# Patient Record
Sex: FEMALE | Race: OTHER | HISPANIC OR LATINO | URBAN - METROPOLITAN AREA
[De-identification: names, ages, dates, MRNs, and addresses within clinical notes are randomized per-mention and may not be internally consistent; named-entity substitution may affect disease eponyms.]

---

## 2022-10-01 ENCOUNTER — OUTPATIENT (OUTPATIENT)
Dept: OUTPATIENT SERVICES | Facility: HOSPITAL | Age: 37
LOS: 1 days | End: 2022-10-01
Payer: COMMERCIAL

## 2022-10-01 DIAGNOSIS — O26.899 OTHER SPECIFIED PREGNANCY RELATED CONDITIONS, UNSPECIFIED TRIMESTER: ICD-10-CM

## 2022-10-01 DIAGNOSIS — Z3A.00 WEEKS OF GESTATION OF PREGNANCY NOT SPECIFIED: ICD-10-CM

## 2022-10-01 PROCEDURE — 99214 OFFICE O/P EST MOD 30 MIN: CPT

## 2022-10-01 PROCEDURE — 90715 TDAP VACCINE 7 YRS/> IM: CPT

## 2022-10-01 RX ORDER — TETANUS TOXOID, REDUCED DIPHTHERIA TOXOID AND ACELLULAR PERTUSSIS VACCINE, ADSORBED 5; 2.5; 8; 8; 2.5 [IU]/.5ML; [IU]/.5ML; UG/.5ML; UG/.5ML; UG/.5ML
0.5 SUSPENSION INTRAMUSCULAR ONCE
Refills: 0 | Status: DISCONTINUED | OUTPATIENT
Start: 2022-10-01 | End: 2022-10-16

## 2022-10-19 ENCOUNTER — APPOINTMENT (OUTPATIENT)
Dept: ANTEPARTUM | Facility: CLINIC | Age: 37
End: 2022-10-19

## 2022-10-19 ENCOUNTER — ASOB RESULT (OUTPATIENT)
Age: 37
End: 2022-10-19

## 2022-10-19 PROCEDURE — 76819 FETAL BIOPHYS PROFIL W/O NST: CPT | Mod: 59

## 2022-10-19 PROCEDURE — 99072 ADDL SUPL MATRL&STAF TM PHE: CPT

## 2022-10-19 PROCEDURE — 76816 OB US FOLLOW-UP PER FETUS: CPT

## 2022-10-25 ENCOUNTER — OUTPATIENT (OUTPATIENT)
Dept: INPATIENT UNIT | Facility: HOSPITAL | Age: 37
LOS: 1 days | End: 2022-10-25

## 2022-10-25 VITALS
TEMPERATURE: 98 F | RESPIRATION RATE: 16 BRPM | HEART RATE: 84 BPM | OXYGEN SATURATION: 98 % | DIASTOLIC BLOOD PRESSURE: 67 MMHG | HEIGHT: 64.17 IN | SYSTOLIC BLOOD PRESSURE: 107 MMHG

## 2022-10-25 VITALS
TEMPERATURE: 98 F | SYSTOLIC BLOOD PRESSURE: 111 MMHG | HEART RATE: 83 BPM | RESPIRATION RATE: 16 BRPM | OXYGEN SATURATION: 97 % | DIASTOLIC BLOOD PRESSURE: 69 MMHG

## 2022-10-25 PROCEDURE — 76815 OB US LIMITED FETUS(S): CPT | Mod: 26

## 2022-10-25 PROCEDURE — 99285 EMERGENCY DEPT VISIT HI MDM: CPT

## 2022-10-25 PROCEDURE — 59025 FETAL NON-STRESS TEST: CPT | Mod: 26

## 2022-10-25 NOTE — ED ADULT TRIAGE NOTE - CHIEF COMPLAINT QUOTE
Patient [place] to the train and felt like she couldn't walk anymore.  C/o low back pain radiating down left leg.  ~ 36 weeks pregnant, due 11/20/22.  Good fetal movement, denies any vaginal bleeding.  Already had ob evaluation today.

## 2022-10-25 NOTE — ED ADULT NURSE NOTE - OBJECTIVE STATEMENT
Pt sent by her OB for leg weakness. States she was walking and felt like her leg was too weak. OB did blood work in office. Pt denies incontinence. VSS, resting in stretcher in NAD. Denies headache/vision changes.

## 2022-10-25 NOTE — ED ADULT TRIAGE NOTE - NS ED TRIAGE CLINICAL UPGRADE
Deteriorating patient status - Patient was clinically upgraded due to deteriorating patient status. The patient is a 8y11m Female complaining of abdominal pain.

## 2022-10-25 NOTE — ED PROVIDER NOTE - NSFOLLOWUPINSTRUCTIONS_ED_ALL_ED_FT
[facility]                                                                                                       Sciatica       Sciatica is pain, numbness, weakness, or tingling along the path of the sciatic nerve. The sciatic nerve starts in the lower back and runs down the back of each leg. The nerve controls the muscles in the lower leg and in the back of the knee. It also provides feeling (sensation) to the back of the thigh, the lower leg, and the sole of the foot. Sciatica is a symptom of another medical condition that pinches or puts pressure on the sciatic nerve.    Sciatica most often only affects one side of the body. Sciatica usually goes away on its own or with treatment. In some cases, sciatica may come back (recur).      What are the causes?    This condition is caused by pressure on the sciatic nerve or pinching of the nerve. This may be the result of:  •A disk in between the bones of the spine bulging out too far (herniated disk).      •Age-related changes in the spinal disks.      •A pain disorder that affects a muscle in the buttock.      •Extra bone growth near the sciatic nerve.      •A break (fracture) of the pelvis.      •Pregnancy.      •Tumor. This is rare.        What increases the risk?    The following factors may make you more likely to develop this condition:  •Playing sports that place pressure or stress on the spine.      •Having poor strength and flexibility.      •A history of back injury or surgery.      •Sitting for long periods of time.      •Doing activities that involve repetitive bending or lifting.      •Obesity.        What are the signs or symptoms?    Symptoms can vary from mild to very severe, and they may include:•Any of these problems in the lower back, leg, hip, or buttock:  •Mild tingling, numbness, or dull aches.      •Burning sensations.      •Sharp pains.        •Numbness in the back of the calf or the sole of the foot.      •Leg weakness.      •Severe back pain that makes movement difficult.      Symptoms may get worse when you cough, sneeze, or laugh, or when you sit or stand for long periods of time.      How is this diagnosed?    This condition may be diagnosed based on:  •Your symptoms and medical history.      •A physical exam.      •Blood tests.    •Imaging tests, such as:  •X-rays.      •MRI.      •CT scan.          How is this treated?    In many cases, this condition improves on its own without treatment. However, treatment may include:  •Reducing or modifying physical activity.      •Exercising and stretching.      •Icing and applying heat to the affected area.    •Medicines that help to:  •Relieve pain and swelling.      •Relax your muscles.        •Injections of medicines that help to relieve pain, irritation, and inflammation around the sciatic nerve (steroids).      •Surgery.        Follow these instructions at home:    Medicines     •Take over-the-counter and prescription medicines only as told by your health care provider.    •Ask your health care provider if the medicine prescribed to you:  •Requires you to avoid driving or using heavy machinery.    •Can cause constipation. You may need to take these actions to prevent or treat constipation:  •Drink enough fluid to keep your urine pale yellow.      •Take over-the-counter or prescription medicines.      •Eat foods that are high in fiber, such as beans, whole grains, and fresh fruits and vegetables.      •Limit foods that are high in fat and processed sugars, such as fried or sweet foods.            Managing pain                 •If directed, put ice on the affected area.  •Put ice in a plastic bag.      •Place a towel between your skin and the bag.      •Leave the ice on for 20 minutes, 2–3 times a day.      •If directed, apply heat to the affected area. Use the heat source that your health care provider recommends, such as a moist heat pack or a heating pad.  •Place a towel between your skin and the heat source.      •Leave the heat on for 20–30 minutes.      •Remove the heat if your skin turns bright red. This is especially important if you are unable to feel pain, heat, or cold. You may have a greater risk of getting burned.          Activity      •Return to your normal activities as told by your health care provider. Ask your health care provider what activities are safe for you.      •Avoid activities that make your symptoms worse.    •Take brief periods of rest throughout the day.  •When you rest for longer periods, mix in some mild activity or stretching between periods of rest. This will help to prevent stiffness and pain.      •Avoid sitting for long periods of time without moving. Get up and move around at least one time each hour.        •Exercise and stretch regularly, as told by your health care provider.      • Do not lift anything that is heavier than 10 lb (4.5 kg) while you have symptoms of sciatica. When you do not have symptoms, you should still avoid heavy lifting, especially repetitive heavy lifting.    •When you lift objects, always use proper lifting technique, which includes:  •Bending your knees.      •Keeping the load close to your body.      •Avoiding twisting.        General instructions     •Maintain a healthy weight. Excess weight puts extra stress on your back.      •Wear supportive, comfortable shoes. Avoid wearing high heels.      •Avoid sleeping on a mattress that is too soft or too hard. A mattress that is firm enough to support your back when you sleep may help to reduce your pain.      •Keep all follow-up visits as told by your health care provider. This is important.        Contact a health care provider if:  •You have pain that:  •Wakes you up when you are sleeping.      •Gets worse when you lie down.      •Is worse than you have experienced in the past.      •Lasts longer than 4 weeks.        •You have an unexplained weight loss.        Get help right away if:    •You are not able to control when you urinate or have bowel movements (incontinence).    •You have:  •Weakness in your lower back, pelvis, buttocks, or legs that gets worse.      •Redness or swelling of your back.      •A burning sensation when you urinate.          Summary    •Sciatica is pain, numbness, weakness, or tingling along the path of the sciatic nerve.      •This condition is caused by pressure on the sciatic nerve or pinching of the nerve.      •Sciatica can cause pain, numbness, or tingling in the lower back, legs, hips, and buttocks.      •Treatment often includes rest, exercise, medicines, and applying ice or heat.      This information is not intended to replace advice given to you by your health care provider. Make sure you discuss any questions you have with your health care provider.      Document Revised: 01/06/2020 Document Reviewed: 01/06/2020    Elsevier Patient Education © 2022 [employer]

## 2022-10-25 NOTE — ED PROVIDER NOTE - CLINICAL SUMMARY MEDICAL DECISION MAKING FREE TEXT BOX
38 yo female  36 weeks pregnant female sent to the ED by her OBGYN for LLE weakness and pain. Exam unremarkable. No focal neuro deficits on exam- no concern for CVA. No s/s spinal cord compression. No traumatic injuries. Clinical picture consistent with sciatica. Conservative management. Currently asymptomatic.

## 2022-10-25 NOTE — ED PROVIDER NOTE - PHYSICAL EXAMINATION
VITAL SIGNS: I have reviewed nursing notes and confirm.  CONSTITUTIONAL: Well appearing, in no acute distress.   SKIN:  warm and dry, no acute rash.   HEAD:  normocephalic, atraumatic.  EYES: EOM intact; conjunctiva and sclera clear.  ENT: No nasal discharge; airway clear.   NECK: Supple; non tender.  BACK: no midline or paraspinal ttp  CARD: S1, S2 normal; no murmurs, gallops, or rubs. Regular rate and rhythm.   RESP:  Clear to auscultation b/l, no wheezes, rales or rhonchi.  ABD: Gravid abdomen, no tenderness   EXT: Normal ROM. No clubbing, cyanosis or edema. 2+ pulses to b/l ue/le.  NEURO: Alert, oriented, CNs intact, no sensory deficit, normal gait and coordination, strength 5/5 in all extremities  PSYCH: Cooperative, mood and affect appropriate.

## 2022-11-02 ENCOUNTER — APPOINTMENT (OUTPATIENT)
Dept: ANTEPARTUM | Facility: CLINIC | Age: 37
End: 2022-11-02

## 2022-11-02 ENCOUNTER — ASOB RESULT (OUTPATIENT)
Age: 37
End: 2022-11-02

## 2022-11-02 PROBLEM — Z00.00 ENCOUNTER FOR PREVENTIVE HEALTH EXAMINATION: Status: ACTIVE | Noted: 2022-11-02

## 2022-11-02 PROCEDURE — 76816 OB US FOLLOW-UP PER FETUS: CPT

## 2022-11-02 PROCEDURE — 99072 ADDL SUPL MATRL&STAF TM PHE: CPT

## 2022-11-02 PROCEDURE — 76819 FETAL BIOPHYS PROFIL W/O NST: CPT | Mod: 59

## 2022-11-04 ENCOUNTER — APPOINTMENT (OUTPATIENT)
Dept: PEDIATRIC CARDIOLOGY | Facility: CLINIC | Age: 37
End: 2022-11-04

## 2022-11-04 PROCEDURE — 99203 OFFICE O/P NEW LOW 30 MIN: CPT | Mod: 25

## 2022-11-04 PROCEDURE — 76821 MIDDLE CEREBRAL ARTERY ECHO: CPT

## 2022-11-04 PROCEDURE — 76825 ECHO EXAM OF FETAL HEART: CPT

## 2022-11-04 PROCEDURE — 76827 ECHO EXAM OF FETAL HEART: CPT

## 2022-11-04 PROCEDURE — 76820 UMBILICAL ARTERY ECHO: CPT

## 2022-11-04 PROCEDURE — 99072 ADDL SUPL MATRL&STAF TM PHE: CPT

## 2022-11-04 PROCEDURE — 93325 DOPPLER ECHO COLOR FLOW MAPG: CPT | Mod: 59

## 2022-11-09 ENCOUNTER — ASOB RESULT (OUTPATIENT)
Age: 37
End: 2022-11-09

## 2022-11-09 ENCOUNTER — APPOINTMENT (OUTPATIENT)
Dept: ANTEPARTUM | Facility: CLINIC | Age: 37
End: 2022-11-09

## 2022-11-09 PROCEDURE — 76820 UMBILICAL ARTERY ECHO: CPT | Mod: 59

## 2022-11-09 PROCEDURE — 76819 FETAL BIOPHYS PROFIL W/O NST: CPT

## 2022-11-09 PROCEDURE — 99072 ADDL SUPL MATRL&STAF TM PHE: CPT

## 2022-11-15 ENCOUNTER — OUTPATIENT (OUTPATIENT)
Dept: OUTPATIENT SERVICES | Facility: HOSPITAL | Age: 37
LOS: 1 days | End: 2022-11-15
Payer: COMMERCIAL

## 2022-11-15 DIAGNOSIS — Z01.818 ENCOUNTER FOR OTHER PREPROCEDURAL EXAMINATION: ICD-10-CM

## 2022-11-15 LAB
ALBUMIN SERPL ELPH-MCNC: 3.9 G/DL — SIGNIFICANT CHANGE UP (ref 3.3–5)
ALP SERPL-CCNC: 167 U/L — HIGH (ref 40–120)
ALT FLD-CCNC: 25 U/L — SIGNIFICANT CHANGE UP (ref 10–45)
ANION GAP SERPL CALC-SCNC: 11 MMOL/L — SIGNIFICANT CHANGE UP (ref 5–17)
AST SERPL-CCNC: 52 U/L — HIGH (ref 10–40)
BILIRUB SERPL-MCNC: 0.4 MG/DL — SIGNIFICANT CHANGE UP (ref 0.2–1.2)
BLD GP AB SCN SERPL QL: NEGATIVE — SIGNIFICANT CHANGE UP
BUN SERPL-MCNC: 10 MG/DL — SIGNIFICANT CHANGE UP (ref 7–23)
CALCIUM SERPL-MCNC: 8.5 MG/DL — SIGNIFICANT CHANGE UP (ref 8.4–10.5)
CHLORIDE SERPL-SCNC: 106 MMOL/L — SIGNIFICANT CHANGE UP (ref 96–108)
CO2 SERPL-SCNC: 21 MMOL/L — LOW (ref 22–31)
CREAT SERPL-MCNC: 0.51 MG/DL — SIGNIFICANT CHANGE UP (ref 0.5–1.3)
EGFR: 122 ML/MIN/1.73M2 — SIGNIFICANT CHANGE UP
GLUCOSE SERPL-MCNC: 99 MG/DL — SIGNIFICANT CHANGE UP (ref 70–99)
HCT VFR BLD CALC: 34 % — LOW (ref 34.5–45)
HGB BLD-MCNC: 11.4 G/DL — LOW (ref 11.5–15.5)
MCHC RBC-ENTMCNC: 31.6 PG — SIGNIFICANT CHANGE UP (ref 27–34)
MCHC RBC-ENTMCNC: 33.5 GM/DL — SIGNIFICANT CHANGE UP (ref 32–36)
MCV RBC AUTO: 94.2 FL — SIGNIFICANT CHANGE UP (ref 80–100)
NRBC # BLD: 0 /100 WBCS — SIGNIFICANT CHANGE UP (ref 0–0)
PLATELET # BLD AUTO: 284 K/UL — SIGNIFICANT CHANGE UP (ref 150–400)
POTASSIUM SERPL-MCNC: 3.7 MMOL/L — SIGNIFICANT CHANGE UP (ref 3.5–5.3)
POTASSIUM SERPL-SCNC: 3.7 MMOL/L — SIGNIFICANT CHANGE UP (ref 3.5–5.3)
PROT SERPL-MCNC: 7.1 G/DL — SIGNIFICANT CHANGE UP (ref 6–8.3)
RBC # BLD: 3.61 M/UL — LOW (ref 3.8–5.2)
RBC # FLD: 13.2 % — SIGNIFICANT CHANGE UP (ref 10.3–14.5)
RH IG SCN BLD-IMP: POSITIVE — SIGNIFICANT CHANGE UP
SODIUM SERPL-SCNC: 138 MMOL/L — SIGNIFICANT CHANGE UP (ref 135–145)
T PALLIDUM AB TITR SER: NEGATIVE — SIGNIFICANT CHANGE UP
WBC # BLD: 5.61 K/UL — SIGNIFICANT CHANGE UP (ref 3.8–10.5)
WBC # FLD AUTO: 5.61 K/UL — SIGNIFICANT CHANGE UP (ref 3.8–10.5)

## 2022-11-15 PROCEDURE — 86780 TREPONEMA PALLIDUM: CPT

## 2022-11-15 PROCEDURE — 86901 BLOOD TYPING SEROLOGIC RH(D): CPT

## 2022-11-15 PROCEDURE — 86900 BLOOD TYPING SEROLOGIC ABO: CPT

## 2022-11-15 PROCEDURE — 85027 COMPLETE CBC AUTOMATED: CPT

## 2022-11-15 PROCEDURE — 86850 RBC ANTIBODY SCREEN: CPT

## 2022-11-15 PROCEDURE — 80053 COMPREHEN METABOLIC PANEL: CPT

## 2022-11-16 ENCOUNTER — TRANSCRIPTION ENCOUNTER (OUTPATIENT)
Age: 37
End: 2022-11-16

## 2022-11-17 ENCOUNTER — INPATIENT (INPATIENT)
Facility: HOSPITAL | Age: 37
LOS: 1 days | Discharge: HOME | End: 2022-11-19
Attending: OBSTETRICS & GYNECOLOGY | Admitting: OBSTETRICS & GYNECOLOGY
Payer: COMMERCIAL

## 2022-11-17 ENCOUNTER — RESULT REVIEW (OUTPATIENT)
Age: 37
End: 2022-11-17

## 2022-11-17 VITALS — WEIGHT: 177.91 LBS | HEIGHT: 62 IN

## 2022-11-17 LAB
BLD GP AB SCN SERPL QL: NEGATIVE — SIGNIFICANT CHANGE UP
COVID-19 SPIKE DOMAIN AB INTERP: POSITIVE
COVID-19 SPIKE DOMAIN ANTIBODY RESULT: >250 U/ML — HIGH
RH IG SCN BLD-IMP: POSITIVE — SIGNIFICANT CHANGE UP
SARS-COV-2 IGG+IGM SERPL QL IA: >250 U/ML — HIGH
SARS-COV-2 IGG+IGM SERPL QL IA: POSITIVE

## 2022-11-17 PROCEDURE — 88304 TISSUE EXAM BY PATHOLOGIST: CPT | Mod: 26

## 2022-11-17 DEVICE — SEPRAFILM 5 X 6": Type: IMPLANTABLE DEVICE | Status: FUNCTIONAL

## 2022-11-17 RX ORDER — ENOXAPARIN SODIUM 100 MG/ML
40 INJECTION SUBCUTANEOUS EVERY 24 HOURS
Refills: 0 | Status: DISCONTINUED | OUTPATIENT
Start: 2022-11-17 | End: 2022-11-19

## 2022-11-17 RX ORDER — ACETAMINOPHEN 500 MG
975 TABLET ORAL
Refills: 0 | Status: DISCONTINUED | OUTPATIENT
Start: 2022-11-17 | End: 2022-11-19

## 2022-11-17 RX ORDER — KETOROLAC TROMETHAMINE 30 MG/ML
30 SYRINGE (ML) INJECTION EVERY 6 HOURS
Refills: 0 | Status: DISCONTINUED | OUTPATIENT
Start: 2022-11-17 | End: 2022-11-18

## 2022-11-17 RX ORDER — OXYCODONE HYDROCHLORIDE 5 MG/1
5 TABLET ORAL ONCE
Refills: 0 | Status: DISCONTINUED | OUTPATIENT
Start: 2022-11-17 | End: 2022-11-19

## 2022-11-17 RX ORDER — FAMOTIDINE 10 MG/ML
20 INJECTION INTRAVENOUS ONCE
Refills: 0 | Status: COMPLETED | OUTPATIENT
Start: 2022-11-17 | End: 2022-11-17

## 2022-11-17 RX ORDER — IBUPROFEN 200 MG
600 TABLET ORAL EVERY 6 HOURS
Refills: 0 | Status: COMPLETED | OUTPATIENT
Start: 2022-11-17 | End: 2023-10-16

## 2022-11-17 RX ORDER — CEFAZOLIN SODIUM 1 G
2000 VIAL (EA) INJECTION ONCE
Refills: 0 | Status: COMPLETED | OUTPATIENT
Start: 2022-11-17 | End: 2022-11-17

## 2022-11-17 RX ORDER — SODIUM CHLORIDE 9 MG/ML
1000 INJECTION, SOLUTION INTRAVENOUS
Refills: 0 | Status: DISCONTINUED | OUTPATIENT
Start: 2022-11-17 | End: 2022-11-17

## 2022-11-17 RX ORDER — OXYTOCIN 10 UNIT/ML
333.33 VIAL (ML) INJECTION
Qty: 20 | Refills: 0 | Status: DISCONTINUED | OUTPATIENT
Start: 2022-11-17 | End: 2022-11-17

## 2022-11-17 RX ORDER — OXYCODONE HYDROCHLORIDE 5 MG/1
5 TABLET ORAL
Refills: 0 | Status: COMPLETED | OUTPATIENT
Start: 2022-11-17 | End: 2022-11-24

## 2022-11-17 RX ORDER — OXYTOCIN 10 UNIT/ML
333.33 VIAL (ML) INJECTION
Qty: 20 | Refills: 0 | Status: DISCONTINUED | OUTPATIENT
Start: 2022-11-17 | End: 2022-11-19

## 2022-11-17 RX ORDER — LANOLIN
1 OINTMENT (GRAM) TOPICAL EVERY 6 HOURS
Refills: 0 | Status: DISCONTINUED | OUTPATIENT
Start: 2022-11-17 | End: 2022-11-19

## 2022-11-17 RX ORDER — SIMETHICONE 80 MG/1
80 TABLET, CHEWABLE ORAL EVERY 4 HOURS
Refills: 0 | Status: DISCONTINUED | OUTPATIENT
Start: 2022-11-17 | End: 2022-11-19

## 2022-11-17 RX ORDER — SODIUM CHLORIDE 9 MG/ML
1000 INJECTION, SOLUTION INTRAVENOUS ONCE
Refills: 0 | Status: COMPLETED | OUTPATIENT
Start: 2022-11-17 | End: 2022-11-17

## 2022-11-17 RX ORDER — SODIUM CHLORIDE 9 MG/ML
1000 INJECTION, SOLUTION INTRAVENOUS
Refills: 0 | Status: DISCONTINUED | OUTPATIENT
Start: 2022-11-17 | End: 2022-11-19

## 2022-11-17 RX ORDER — DIPHENHYDRAMINE HCL 50 MG
25 CAPSULE ORAL EVERY 6 HOURS
Refills: 0 | Status: DISCONTINUED | OUTPATIENT
Start: 2022-11-17 | End: 2022-11-19

## 2022-11-17 RX ORDER — TETANUS TOXOID, REDUCED DIPHTHERIA TOXOID AND ACELLULAR PERTUSSIS VACCINE, ADSORBED 5; 2.5; 8; 8; 2.5 [IU]/.5ML; [IU]/.5ML; UG/.5ML; UG/.5ML; UG/.5ML
0.5 SUSPENSION INTRAMUSCULAR ONCE
Refills: 0 | Status: DISCONTINUED | OUTPATIENT
Start: 2022-11-17 | End: 2022-11-19

## 2022-11-17 RX ORDER — MAGNESIUM HYDROXIDE 400 MG/1
30 TABLET, CHEWABLE ORAL
Refills: 0 | Status: DISCONTINUED | OUTPATIENT
Start: 2022-11-17 | End: 2022-11-19

## 2022-11-17 RX ORDER — CITRIC ACID/SODIUM CITRATE 300-500 MG
30 SOLUTION, ORAL ORAL ONCE
Refills: 0 | Status: COMPLETED | OUTPATIENT
Start: 2022-11-17 | End: 2022-11-17

## 2022-11-17 RX ADMIN — Medication 30 MILLIGRAM(S): at 15:40

## 2022-11-17 RX ADMIN — Medication 30 MILLIGRAM(S): at 12:40

## 2022-11-17 RX ADMIN — FAMOTIDINE 20 MILLIGRAM(S): 10 INJECTION INTRAVENOUS at 07:31

## 2022-11-17 RX ADMIN — Medication 30 MILLILITER(S): at 07:30

## 2022-11-17 RX ADMIN — Medication 975 MILLIGRAM(S): at 20:49

## 2022-11-17 RX ADMIN — Medication 975 MILLIGRAM(S): at 21:19

## 2022-11-17 RX ADMIN — Medication 100 MILLIGRAM(S): at 07:46

## 2022-11-17 RX ADMIN — Medication 30 MILLIGRAM(S): at 23:55

## 2022-11-17 RX ADMIN — Medication 1000 MILLIUNIT(S)/MIN: at 10:41

## 2022-11-17 RX ADMIN — SODIUM CHLORIDE 2000 MILLILITER(S): 9 INJECTION, SOLUTION INTRAVENOUS at 06:40

## 2022-11-17 RX ADMIN — Medication 1000 MILLIUNIT(S)/MIN: at 09:20

## 2022-11-17 RX ADMIN — Medication 30 MILLIGRAM(S): at 18:38

## 2022-11-17 RX ADMIN — ENOXAPARIN SODIUM 40 MILLIGRAM(S): 100 INJECTION SUBCUTANEOUS at 22:55

## 2022-11-17 NOTE — PRE-ANESTHESIA EVALUATION ADULT - NSANTHPMHFT_GEN_ALL_CORE
36 y/o  @ 39w presents for a repeat  with bilateral salpingectomy. Denies VB/LOF/ctx.   FM    Ante: prenatal care in [place]. Passed GT. Denies NIPT done in [facility]. Reports sonograms WNL. Pt established care a month ago.   Fetal Echo done to R/O CHD (large ductal aneurysm, otherwise structually normal heart), fetus recommended to obtain fetal echo postpartum  EFW 3000g    OBhx:  G1: c/s for cephalopelvic disproportion , arrest of dilation  GYN: Denies hx fibroids, cysts, STIs, abnormal pap smears.   MedHx: Denies  SurgHx: C section   Meds: PNV  Allergies: NKDA

## 2022-11-17 NOTE — PATIENT PROFILE OB - FALL HARM RISK - UNIVERSAL INTERVENTIONS
Bed in lowest position, wheels locked, appropriate side rails in place/Call bell, personal items and telephone in reach/Instruct patient to call for assistance before getting out of bed or chair/Non-slip footwear when patient is out of bed/[place] to call system/Physically safe environment - no spills, clutter or unnecessary equipment/Purposeful Proactive Rounding/Room/bathroom lighting operational, light cord in reach

## 2022-11-18 ENCOUNTER — TRANSCRIPTION ENCOUNTER (OUTPATIENT)
Age: 37
End: 2022-11-18

## 2022-11-18 ENCOUNTER — APPOINTMENT (OUTPATIENT)
Dept: ANTEPARTUM | Facility: CLINIC | Age: 37
End: 2022-11-18

## 2022-11-18 RX ORDER — IBUPROFEN 200 MG
1 TABLET ORAL
Qty: 0 | Refills: 0 | DISCHARGE
Start: 2022-11-18

## 2022-11-18 RX ORDER — IBUPROFEN 200 MG
600 TABLET ORAL EVERY 6 HOURS
Refills: 0 | Status: DISCONTINUED | OUTPATIENT
Start: 2022-11-18 | End: 2022-11-19

## 2022-11-18 RX ORDER — ACETAMINOPHEN 500 MG
3 TABLET ORAL
Qty: 0 | Refills: 0 | DISCHARGE
Start: 2022-11-18

## 2022-11-18 RX ORDER — OXYCODONE HYDROCHLORIDE 5 MG/1
5 TABLET ORAL
Refills: 0 | Status: DISCONTINUED | OUTPATIENT
Start: 2022-11-18 | End: 2022-11-19

## 2022-11-18 RX ADMIN — OXYCODONE HYDROCHLORIDE 5 MILLIGRAM(S): 5 TABLET ORAL at 22:00

## 2022-11-18 RX ADMIN — Medication 30 MILLIGRAM(S): at 00:25

## 2022-11-18 RX ADMIN — Medication 600 MILLIGRAM(S): at 19:23

## 2022-11-18 RX ADMIN — Medication 30 MILLIGRAM(S): at 06:49

## 2022-11-18 RX ADMIN — OXYCODONE HYDROCHLORIDE 5 MILLIGRAM(S): 5 TABLET ORAL at 10:30

## 2022-11-18 RX ADMIN — SIMETHICONE 80 MILLIGRAM(S): 80 TABLET, CHEWABLE ORAL at 14:27

## 2022-11-18 RX ADMIN — Medication 975 MILLIGRAM(S): at 08:57

## 2022-11-18 RX ADMIN — Medication 975 MILLIGRAM(S): at 14:25

## 2022-11-18 RX ADMIN — Medication 600 MILLIGRAM(S): at 14:00

## 2022-11-18 RX ADMIN — Medication 600 MILLIGRAM(S): at 18:49

## 2022-11-18 RX ADMIN — OXYCODONE HYDROCHLORIDE 5 MILLIGRAM(S): 5 TABLET ORAL at 22:30

## 2022-11-18 RX ADMIN — Medication 975 MILLIGRAM(S): at 22:24

## 2022-11-18 RX ADMIN — Medication 600 MILLIGRAM(S): at 11:33

## 2022-11-18 RX ADMIN — MAGNESIUM HYDROXIDE 30 MILLILITER(S): 400 TABLET, CHEWABLE ORAL at 14:26

## 2022-11-18 RX ADMIN — MAGNESIUM HYDROXIDE 30 MILLILITER(S): 400 TABLET, CHEWABLE ORAL at 10:59

## 2022-11-18 RX ADMIN — Medication 975 MILLIGRAM(S): at 08:22

## 2022-11-18 RX ADMIN — OXYCODONE HYDROCHLORIDE 5 MILLIGRAM(S): 5 TABLET ORAL at 10:10

## 2022-11-18 RX ADMIN — Medication 975 MILLIGRAM(S): at 21:54

## 2022-11-18 RX ADMIN — Medication 975 MILLIGRAM(S): at 15:00

## 2022-11-18 RX ADMIN — Medication 30 MILLIGRAM(S): at 06:19

## 2022-11-18 RX ADMIN — ENOXAPARIN SODIUM 40 MILLIGRAM(S): 100 INJECTION SUBCUTANEOUS at 21:55

## 2022-11-18 NOTE — LACTATION INITIAL EVALUATION - NS LACT CON REASON FOR REQ
Ind[place]ian language line  [phone]. Pt denies any complaints when asked. She has expressible colostrum and reports infantis nursing well. Mother plans to offer breast and formula feedings by choice. Pt made aware of LC availability as needed./multiparous mom/staff request

## 2022-11-18 NOTE — DISCHARGE NOTE OB - MEDICATION SUMMARY - MEDICATIONS TO TAKE
I will START or STAY ON the medications listed below when I get home from the hospital:    acetaminophen 325 mg oral tablet  -- 3 tab(s) by mouth every 6 hours  -- Indication: For Pain    ibuprofen 600 mg oral tablet  -- 1 tab(s) by mouth every 6 hours  -- Indication: For Pain    Prenatal 1 oral capsule  -- Indication: For PREGNANCY

## 2022-11-18 NOTE — DISCHARGE NOTE OB - PATIENT PORTAL LINK FT
You can access the FollowMyHealth Patient Portal offered by [facility] by registering at the following website: [email or web address]. By joining [employer]’s FollowMyHealth portal, you will also be able to view your health information using other applications (apps) compatible with our system.

## 2022-11-18 NOTE — DISCHARGE NOTE OB - CARE PROVIDER_API CALL
June Velazquez)  Obstetrics and Gynecology  [address] [place] [id number]  Phone: [phone]  Fax: [phone]  Follow Up Time: 2 weeks

## 2022-11-18 NOTE — LACTATION INITIAL EVALUATION - LACTATION INTERVENTIONS
initiate/review hand expression/initiate/review techniques for position and latch/reviewed components of an effective feeding and at least 8 effective feedings per day required/reviewed importance of monitoring infant diapers, the breastfeeding log, and minimum output each day/reviewed benefits and recommendations for rooming in/reviewed feeding on demand/by cue at least 8 times a day/reviewed indications of inadequate milk transfer that would require supplementation

## 2022-11-18 NOTE — DISCHARGE NOTE OB - CARE PLAN
1 Principal Discharge DX:	Pregnancy  Assessment and plan of treatment:	 delivery, meeting all postoperative milestones.  Follow up in 1-2 weeks.

## 2022-11-19 VITALS
OXYGEN SATURATION: 96 % | SYSTOLIC BLOOD PRESSURE: 104 MMHG | HEART RATE: 81 BPM | DIASTOLIC BLOOD PRESSURE: 66 MMHG | RESPIRATION RATE: 17 BRPM | TEMPERATURE: 98 F

## 2022-11-19 LAB
HCT VFR BLD CALC: 28.3 % — LOW (ref 34.5–45)
HGB BLD-MCNC: 9.5 G/DL — LOW (ref 11.5–15.5)
MCHC RBC-ENTMCNC: 32.4 PG — SIGNIFICANT CHANGE UP (ref 27–34)
MCHC RBC-ENTMCNC: 33.6 GM/DL — SIGNIFICANT CHANGE UP (ref 32–36)
MCV RBC AUTO: 96.6 FL — SIGNIFICANT CHANGE UP (ref 80–100)
NRBC # BLD: 0 /100 WBCS — SIGNIFICANT CHANGE UP (ref 0–0)
PLATELET # BLD AUTO: 250 K/UL — SIGNIFICANT CHANGE UP (ref 150–400)
RBC # BLD: 2.93 M/UL — LOW (ref 3.8–5.2)
RBC # FLD: 13.5 % — SIGNIFICANT CHANGE UP (ref 10.3–14.5)
WBC # BLD: 9.01 K/UL — SIGNIFICANT CHANGE UP (ref 3.8–10.5)
WBC # FLD AUTO: 9.01 K/UL — SIGNIFICANT CHANGE UP (ref 3.8–10.5)

## 2022-11-19 PROCEDURE — 88304 TISSUE EXAM BY PATHOLOGIST: CPT

## 2022-11-19 PROCEDURE — 86769 SARS-COV-2 COVID-19 ANTIBODY: CPT

## 2022-11-19 PROCEDURE — 86850 RBC ANTIBODY SCREEN: CPT

## 2022-11-19 PROCEDURE — 86901 BLOOD TYPING SEROLOGIC RH(D): CPT

## 2022-11-19 PROCEDURE — C1765: CPT

## 2022-11-19 PROCEDURE — 86900 BLOOD TYPING SEROLOGIC ABO: CPT

## 2022-11-19 PROCEDURE — 59050 FETAL MONITOR W/REPORT: CPT

## 2022-11-19 PROCEDURE — 36415 COLL VENOUS BLD VENIPUNCTURE: CPT

## 2022-11-19 PROCEDURE — 85027 COMPLETE CBC AUTOMATED: CPT

## 2022-11-19 RX ADMIN — Medication 600 MILLIGRAM(S): at 12:23

## 2022-11-19 RX ADMIN — Medication 975 MILLIGRAM(S): at 02:49

## 2022-11-19 RX ADMIN — Medication 600 MILLIGRAM(S): at 06:55

## 2022-11-19 RX ADMIN — Medication 600 MILLIGRAM(S): at 07:25

## 2022-11-19 RX ADMIN — Medication 600 MILLIGRAM(S): at 00:03

## 2022-11-19 RX ADMIN — Medication 600 MILLIGRAM(S): at 00:33

## 2022-11-19 RX ADMIN — Medication 975 MILLIGRAM(S): at 02:19

## 2022-11-19 RX ADMIN — Medication 975 MILLIGRAM(S): at 09:49

## 2022-11-19 RX ADMIN — Medication 600 MILLIGRAM(S): at 13:23

## 2022-11-19 RX ADMIN — Medication 975 MILLIGRAM(S): at 10:49

## 2022-11-19 NOTE — PROGRESS NOTE ADULT - SUBJECTIVE AND OBJECTIVE BOX
Patient evaluated at bedside.   She reports pain is well controlled with OPM.  She has been ambulating without assistance, voiding spontaneously, passing gas, tolerating regular diet and is breastfeeding.  She denies HA, dizziness, CP, palpitations, SOB, n/v, or heavy vaginal bleeding.    Physical Exam:  Vital Signs Last 24 Hrs  T(C): 37 (19 Nov 2022 05:26), Max: 37 (18 Nov 2022 10:19)  T(F): 98.6 (19 Nov 2022 05:26), Max: 98.6 (18 Nov 2022 10:19)  HR: 82 (19 Nov 2022 05:26) (76 - 88)  BP: 107/68 (19 Nov 2022 05:26) (102/65 - 111/62)  BP(mean): --  RR: 18 (19 Nov 2022 05:26) (17 - 18)  SpO2: 98% (19 Nov 2022 05:26) (97% - 98%)    Parameters below as of 18 Nov 2022 14:00  Patient On (Oxygen Delivery Method): room air        Gen: NAD  Abd: + BS, soft, nontender, nondistended, no rebound or guarding  Incision clean, dry and intact  uterus firm at midline below the umbilicus  : lochia WNL  Extremities: no                         9.5    9.01  )-----------( 250      ( 19 Nov 2022 05:30 )             28.3               
Patient evaluated at bedside this morning, resting comfortable in bed, with no acute events overnight.  She reports pain is well controlled with toradol and tylenol.  She denies headache, dizziness, chest pain, palpitations, shortness of breath, nausea, vomiting or heavy vaginal bleeding.  She has not tried ambulating since procedure, carmona remains in place at this time. Tolerating clear liquids.     Physical Exam:  Vital Signs Last 24 Hrs  T(C): 37.2 (18 Nov 2022 05:41), Max: 37.2 (18 Nov 2022 05:41)  T(F): 99 (18 Nov 2022 05:41), Max: 99 (18 Nov 2022 05:41)  HR: 84 (18 Nov 2022 05:41) (56 - 106)  BP: 105/63 (18 Nov 2022 05:41) (100/60 - 133/63)  BP(mean): 82 (17 Nov 2022 12:15) (78 - 91)  RR: 16 (18 Nov 2022 05:41) (14 - 17)  SpO2: 97% (18 Nov 2022 05:41) (91% - 97%)    Parameters below as of 17 Nov 2022 12:30  Patient On (Oxygen Delivery Method): room air        GA: NAD, A+0 x 3  CV: RRR, no murmurs/rubs  Pulm: CTAB, no wheezes/rhonchi  Breasts: soft, nontender, no palpable masses  Abd: + BS, soft, nontender, nondistended, no rebound or guarding, incision clean, dry and intact, uterus firm at midline and below umbilicus  : carmona in situ, lochia WNL  Extremities: no swelling or calf tenderness, reflexes +2 bilaterally, SCD in place                  acetaminophen     Tablet .. 975 milliGRAM(s) Oral <User Schedule>  diphenhydrAMINE 25 milliGRAM(s) Oral every 6 hours PRN  diphtheria/tetanus/pertussis (acellular) Vaccine (Adacel) 0.5 milliLiter(s) IntraMuscular once  enoxaparin Injectable 40 milliGRAM(s) SubCutaneous every 24 hours  ibuprofen  Tablet. 600 milliGRAM(s) Oral every 6 hours  lactated ringers. 1000 milliLiter(s) IV Continuous <Continuous>  lanolin Ointment 1 Application(s) Topical every 6 hours PRN  magnesium hydroxide Suspension 30 milliLiter(s) Oral two times a day PRN  oxyCODONE    IR 5 milliGRAM(s) Oral every 3 hours PRN  oxyCODONE    IR 5 milliGRAM(s) Oral once PRN  oxytocin Infusion 333.333 milliUNIT(s)/Min IV Continuous <Continuous>  simethicone 80 milliGRAM(s) Chew every 4 hours PRN

## 2022-11-19 NOTE — PROGRESS NOTE ADULT - ASSESSMENT
37y Female POD#2  s/p C/S, Uncomplicated                                       1. Neuro/Pain:  OPM  2  CV:  VS per routine  3. Pulm: Encourage ISS & Ambulation  4. GI:  Reg  5. : Voiding  6. DVT ppx: SCDs, lovenox 40mg qd  7. Dispo: POD #2 or #3
A/P   37y  s/p  section, POD #1, stable  -  Pain: PO motrin and tylenol, oxycodone PRN  -  Post-operatively labs: post-op Hgb , hemodynamically stable, no symptoms of anemia   -  GI: tolerating clears, passing gas, ADAT  -  : carmona in situ; DC this AM, f/u TOV  -  DVT prophylaxis: ambulation, SCDs, lovenox  -  Dispo: POD 3 or 4

## 2022-11-19 NOTE — PROGRESS NOTE ADULT - ATTENDING COMMENTS
Pt seen and examined at bedside, pt h as no current complaints. Agree with above evaluation and assessment. Plan for discharge today if patient remains hemodynamically stable
Patient seen and examined at bedside. Agree with above evaluation and assessment. Since note patient has passed her TOV and has eaten and passed gas. Pain well controlled. Plan for discharge POD 2 or 3 if pt remains hemodynamically stable

## 2022-11-26 ENCOUNTER — EMERGENCY (EMERGENCY)
Facility: HOSPITAL | Age: 37
LOS: 1 days | Discharge: HOME | End: 2022-11-26
Attending: EMERGENCY MEDICINE | Admitting: EMERGENCY MEDICINE
Payer: COMMERCIAL

## 2022-11-26 VITALS
HEART RATE: 70 BPM | SYSTOLIC BLOOD PRESSURE: 116 MMHG | RESPIRATION RATE: 18 BRPM | OXYGEN SATURATION: 95 % | DIASTOLIC BLOOD PRESSURE: 66 MMHG

## 2022-11-26 VITALS
RESPIRATION RATE: 18 BRPM | WEIGHT: 160.06 LBS | OXYGEN SATURATION: 98 % | DIASTOLIC BLOOD PRESSURE: 71 MMHG | TEMPERATURE: 98 F | SYSTOLIC BLOOD PRESSURE: 122 MMHG | HEIGHT: 62 IN | HEART RATE: 76 BPM

## 2022-11-26 DIAGNOSIS — R79.89 OTHER SPECIFIED ABNORMAL FINDINGS OF BLOOD CHEMISTRY: ICD-10-CM

## 2022-11-26 DIAGNOSIS — R51.9 HEADACHE, UNSPECIFIED: ICD-10-CM

## 2022-11-26 LAB
ALBUMIN SERPL ELPH-MCNC: 3.6 G/DL — SIGNIFICANT CHANGE UP (ref 3.3–5)
ALP SERPL-CCNC: 154 U/L — HIGH (ref 40–120)
ALT FLD-CCNC: 129 U/L — HIGH (ref 10–45)
ANION GAP SERPL CALC-SCNC: 11 MMOL/L — SIGNIFICANT CHANGE UP (ref 5–17)
AST SERPL-CCNC: 273 U/L — HIGH (ref 10–40)
BASOPHILS # BLD AUTO: 0.01 K/UL — SIGNIFICANT CHANGE UP (ref 0–0.2)
BASOPHILS NFR BLD AUTO: 0.2 % — SIGNIFICANT CHANGE UP (ref 0–2)
BILIRUB SERPL-MCNC: 0.2 MG/DL — SIGNIFICANT CHANGE UP (ref 0.2–1.2)
BUN SERPL-MCNC: 14 MG/DL — SIGNIFICANT CHANGE UP (ref 7–23)
CALCIUM SERPL-MCNC: 8.8 MG/DL — SIGNIFICANT CHANGE UP (ref 8.4–10.5)
CHLORIDE SERPL-SCNC: 110 MMOL/L — HIGH (ref 96–108)
CO2 SERPL-SCNC: 21 MMOL/L — LOW (ref 22–31)
CREAT SERPL-MCNC: 0.41 MG/DL — LOW (ref 0.5–1.3)
EGFR: 129 ML/MIN/1.73M2 — SIGNIFICANT CHANGE UP
EOSINOPHIL # BLD AUTO: 0.13 K/UL — SIGNIFICANT CHANGE UP (ref 0–0.5)
EOSINOPHIL NFR BLD AUTO: 2.3 % — SIGNIFICANT CHANGE UP (ref 0–6)
GLUCOSE SERPL-MCNC: 92 MG/DL — SIGNIFICANT CHANGE UP (ref 70–99)
HCT VFR BLD CALC: 29.5 % — LOW (ref 34.5–45)
HGB BLD-MCNC: 9.8 G/DL — LOW (ref 11.5–15.5)
IMM GRANULOCYTES NFR BLD AUTO: 0.5 % — SIGNIFICANT CHANGE UP (ref 0–0.9)
LYMPHOCYTES # BLD AUTO: 1.26 K/UL — SIGNIFICANT CHANGE UP (ref 1–3.3)
LYMPHOCYTES # BLD AUTO: 22.2 % — SIGNIFICANT CHANGE UP (ref 13–44)
MCHC RBC-ENTMCNC: 32 PG — SIGNIFICANT CHANGE UP (ref 27–34)
MCHC RBC-ENTMCNC: 33.2 GM/DL — SIGNIFICANT CHANGE UP (ref 32–36)
MCV RBC AUTO: 96.4 FL — SIGNIFICANT CHANGE UP (ref 80–100)
MONOCYTES # BLD AUTO: 0.4 K/UL — SIGNIFICANT CHANGE UP (ref 0–0.9)
MONOCYTES NFR BLD AUTO: 7 % — SIGNIFICANT CHANGE UP (ref 2–14)
NEUTROPHILS # BLD AUTO: 3.85 K/UL — SIGNIFICANT CHANGE UP (ref 1.8–7.4)
NEUTROPHILS NFR BLD AUTO: 67.8 % — SIGNIFICANT CHANGE UP (ref 43–77)
NRBC # BLD: 0 /100 WBCS — SIGNIFICANT CHANGE UP (ref 0–0)
PLATELET # BLD AUTO: 439 K/UL — HIGH (ref 150–400)
POTASSIUM SERPL-MCNC: 3.5 MMOL/L — SIGNIFICANT CHANGE UP (ref 3.5–5.3)
POTASSIUM SERPL-SCNC: 3.5 MMOL/L — SIGNIFICANT CHANGE UP (ref 3.5–5.3)
PROT SERPL-MCNC: 6.8 G/DL — SIGNIFICANT CHANGE UP (ref 6–8.3)
RBC # BLD: 3.06 M/UL — LOW (ref 3.8–5.2)
RBC # FLD: 13.3 % — SIGNIFICANT CHANGE UP (ref 10.3–14.5)
SARS-COV-2 RNA SPEC QL NAA+PROBE: NEGATIVE — SIGNIFICANT CHANGE UP
SODIUM SERPL-SCNC: 142 MMOL/L — SIGNIFICANT CHANGE UP (ref 135–145)
WBC # BLD: 5.68 K/UL — SIGNIFICANT CHANGE UP (ref 3.8–10.5)
WBC # FLD AUTO: 5.68 K/UL — SIGNIFICANT CHANGE UP (ref 3.8–10.5)

## 2022-11-26 PROCEDURE — 85025 COMPLETE CBC W/AUTO DIFF WBC: CPT

## 2022-11-26 PROCEDURE — 83615 LACTATE (LD) (LDH) ENZYME: CPT

## 2022-11-26 PROCEDURE — 99284 EMERGENCY DEPT VISIT MOD MDM: CPT

## 2022-11-26 PROCEDURE — 96375 TX/PRO/DX INJ NEW DRUG ADDON: CPT

## 2022-11-26 PROCEDURE — 87635 SARS-COV-2 COVID-19 AMP PRB: CPT

## 2022-11-26 PROCEDURE — 96374 THER/PROPH/DIAG INJ IV PUSH: CPT

## 2022-11-26 PROCEDURE — 99284 EMERGENCY DEPT VISIT MOD MDM: CPT | Mod: 25

## 2022-11-26 PROCEDURE — 80053 COMPREHEN METABOLIC PANEL: CPT

## 2022-11-26 PROCEDURE — 36415 COLL VENOUS BLD VENIPUNCTURE: CPT

## 2022-11-26 PROCEDURE — 84550 ASSAY OF BLOOD/URIC ACID: CPT

## 2022-11-26 RX ORDER — METOCLOPRAMIDE HCL 10 MG
10 TABLET ORAL ONCE
Refills: 0 | Status: COMPLETED | OUTPATIENT
Start: 2022-11-26 | End: 2022-11-26

## 2022-11-26 RX ORDER — SODIUM CHLORIDE 9 MG/ML
1000 INJECTION INTRAMUSCULAR; INTRAVENOUS; SUBCUTANEOUS ONCE
Refills: 0 | Status: COMPLETED | OUTPATIENT
Start: 2022-11-26 | End: 2022-11-26

## 2022-11-26 RX ORDER — KETOROLAC TROMETHAMINE 30 MG/ML
15 SYRINGE (ML) INJECTION ONCE
Refills: 0 | Status: DISCONTINUED | OUTPATIENT
Start: 2022-11-26 | End: 2022-11-26

## 2022-11-26 RX ADMIN — Medication 15 MILLIGRAM(S): at 15:15

## 2022-11-26 RX ADMIN — Medication 104 MILLIGRAM(S): at 15:15

## 2022-11-26 RX ADMIN — SODIUM CHLORIDE 1000 MILLILITER(S): 9 INJECTION INTRAMUSCULAR; INTRAVENOUS; SUBCUTANEOUS at 15:15

## 2022-11-26 NOTE — ED ADULT NURSE NOTE - OBJECTIVE STATEMENT
patient presents to ED with Lt. side headache for a week, patient had epistaxis and vomiting yesterday, denies weakness, drooling, other neuro deficits, fever, cough, chest pain, sob, dizziness. stable at bed A&Ox4.

## 2022-11-26 NOTE — ED PROVIDER NOTE - NS ED ATTENDING STATEMENT MOD
This was a shared visit with the NIHARIKA. I reviewed and verified the documentation and independently performed the documented:

## 2022-11-26 NOTE — ED PROVIDER NOTE - CLINICAL SUMMARY MEDICAL DECISION MAKING FREE TEXT BOX
pt is 10d post partum c/o 3d of h/a, no visual changes/n/v/fevers/dizziness, not hypertensive in ed, has not taken any tyl or motrin for the h/a, not thunder clap, not worst h/a of life, non focal neuro exam, well appearing, given toradol/reglan in ed w/good relief, however found to have elevated LFTs - gyn consulted for preeclampsia -- pt seen and cleared for dc w/strict return precautions, pt understands and agrees w/plan, strict return precautions given

## 2022-11-26 NOTE — ED PROVIDER NOTE - OBJECTIVE STATEMENT
The pt is a 38 y/o F, who presents to ED c/o h/a x few d. Pt is 10d post partum, states that h/a is constant and throbbing, R sided, 7/10, has not taken any tyl or motrin for it. Had a nosebleed few d ago and concerned that it's related. Denies visual changes, n/v/d, neck pain, dizziness, syncope, fevers, chills, gait disturbances, vag bleeding.

## 2022-11-26 NOTE — ED ADULT TRIAGE NOTE - CHIEF COMPLAINT QUOTE
Pt 10 days postpartum-  at 39wks. C/o R sided HA x3days. Also c/o epistaxis 1 episode daily x3days. Reports blurred vision yesterday morning. Denies blurred vision in triage. Denies fevers, chills, n/v, chest pain, SOB, leg swelling, dizziness

## 2022-11-26 NOTE — ED PROVIDER NOTE - CARE PROVIDER_API CALL
June Velazquez)  Obstetrics and Gynecology  [address] [place] [address]  Phone: [phone]  Fax: [phone]  Follow Up Time:

## 2022-11-26 NOTE — CONSULT NOTE ADULT - SUBJECTIVE AND OBJECTIVE BOX
38 y/o  POD#9 s/p scheduled rCS + BS presenting to ED for evaluation of HA since Thursday. Pt states that she has been experiencing HA on and off, as well as blurry vision yesterday which spontaneously resolved. She has been taking PO pain medications at home but has not noticed if it helps the HA. She denies any elevated BP in pregnancy or postpartum. Pt also concerned that she has not been having vaginal bleeding for the past two days. Upon presentation to the ED pt received reglan and toradol and HA resolved however she was found to have elevated LFTs 273/129. She currently denies HA, vision changes/scotoma, chest pain, palpitations, SOB, abdominal pain, RUQ/epigastric pain, nausea/vomiting, diarrhea, or dysuria.    ObHx: G1 -  - pCS for arrest of dilation, uncomplicated  G2 - 22 - rCS + BS, uncomplicated  GynHx: denies  PMH: denies  PSH: CSx2  Meds: denies  Allergies: NKDA        PHYSICAL EXAM:   Vital Signs Last 24 Hrs  T(C): 36.8 (2022 15:19), Max: 36.9 (2022 14:00)  T(F): 98.3 (2022 15:19), Max: 98.5 (2022 14:00)  HR: 70 (2022 16:04) (60 - 76)  BP: 116/66 (2022 16:04) (112/67 - 122/71)  RR: 18 (2022 16:04) (18 - 18)  SpO2: 95% (2022 16:04) (95% - 98%)    Parameters below as of 2022 16:04  Patient On (Oxygen Delivery Method): room air        **************************  Constitutional: Alert & Oriented x3, No acute distress  Respiratory: Clear to ausculation bilaterally; no wheezing, rhonchi, or crackles no inc WOB  Cardiovascular: regular rate and rhythm  Gastrointestinal: soft, non tender, positive bowel sounds, no rebound or guarding, no RUQ/epigastric tenderness, neg Miller sign, incision clean/dry/intact w/ steri strips  Extremities: no calf tenderness, mild LE edema, 1+ upper extremity reflexes b/l    LABS:                        9.8    5.68  )-----------( 439      ( 2022 15:00 )             29.5         142  |  110<H>  |  14  ----------------------------<  92  3.5   |  21<L>  |  0.41<L>    Ca    8.8      2022 15:00    TPro  6.8  /  Alb  3.6  /  TBili  0.2  /  DBili  x   /  AST  273<H>  /  ALT  129<H>  /  AlkPhos  154<H>

## 2022-11-26 NOTE — ED PROVIDER NOTE - NSFOLLOWUPINSTRUCTIONS_ED_ALL_ED_FT
CONTROLE RUIZ PRESIÓN ARTERIAL DOS VECES AL DÍA. TOME TYLENOL O MOTRIN SEGÚN SE NECESITE PARA EL DOLOR DE PRASHANT. SEGUIMIENTO CON DR ISAAK MORAN. REGRESE INMEDIATAMENTE POR PRESIÓN ARTERIAL >160, NÁUSEAS, VÓMITOS, ALTERACIONES VISUALES.    Preeclampsia and Eclampsia  Preeclampsia is a serious condition that may develop during pregnancy. This condition involves high blood pressure during pregnancy and causes symptoms such as headaches, vision changes, and increased swelling in the legs, hands, and face. Preeclampsia occurs after 20 weeks of pregnancy. Eclampsia is a seizure that happens from worsening preeclampsia. Diagnosing and managing preeclampsia early is important. If not treated early, it can cause serious problems for mother and baby  There is no cure for this condition. However, during pregnancy, delivering the baby may be the best treatment for preeclampsia or eclampsia. For most women, symptoms of preeclampsia and eclampsia go away after giving birth. In rare cases, a woman may develop preeclampsia or eclampsia after giving birth. This usually occurs within 48 hours after childbirth but may occur up to 6 weeks after giving birth.  What are the causes?  The cause of this condition is not known.  What increases the risk?  The following factors make you more likely to develop preeclampsia:  •Being pregnant for the first time or being pregnant with multiples.  •Having had preeclampsia or a condition called hemolysis, elevated liver enzymes, and low platelet count (HELLP)syndrome during a past pregnancy.  •Having a family history of preeclampsia.  •Being older than age 35.  •Being obese.  •Becoming pregnant through fertility treatments.  Conditions that reduce blood flow or oxygen to your placenta and baby may also increase your risk. These include:  •High blood pressure before, during, or immediately following pregnancy.  •Kidney disease.  •Diabetes  •Blood clotting disorders.  •Autoimmune diseases, such as lupus.  •Sleep apnea.  What are the signs or symptoms?  Common symptoms of this condition include:  •A severe, throbbing headache that does not go away.  •Vision problems, such as blurred or double vision and light sensitivity.  •Pain in the stomach, especially the right upper region.  •Pain in the shoulder.  Other symptoms that may develop as the condition gets worse include:  •Sudden weight gain because of fluid buildup in the body. This causes swelling of the face, hands, legs, and feet.  •Severe nausea and vomiting.  •Urinating less than usual.  •Shortness of breath.  •Seizures.  How is this diagnosed?  Your health care provider will ask you about symptoms and check for signs of preeclampsia during your prenatal visits. You will also have routine tests, including:  •Checking your blood pressure.  •Urine tests to check for protein.  •Blood tests to assess your organ function.  •Monitoring your baby's heart rate.  •Ultrasounds to check fetal growth.  How is this treated?  You and your health care provider will determine the treatment that is best for you. Treatment may include:  •Frequent prenatal visits to check for preeclampsia.  •Medicine to lower your blood pressure.  •Medicine to prevent seizures.  •Low-dose aspirin during your pregnancy.  •Staying in the hospital, in severe cases. You will be given medicines to control your blood pressure and the amount of fluids in your body.  •Delivering your baby.  Work with your health care provider to manage any chronic health conditions, such as diabetes or kidney problems. Also, work with your health care provider to manage weight gain during pregnancy.  Follow these instructions at home:  Eating and drinking   •Drink enough fluid to keep your urine pale yellow.  •Avoid caffeine. Caffeine may increase blood pressure and heart rate and lead to dehydration.  •Reduce the amount of salt that you eat.  Lifestyle   • Do not use any products that contain nicotine or tobacco. These products include cigarettes, chewing tobacco, and vaping devices, such as e-cigarettes. If you need help quitting, ask your health care provider.  • Do not use alcohol or drugs.  •Avoid stress as much as possible.  •Rest and get plenty of sleep.  General instructions   •Take over-the-counter and prescription medicines only as told by your health care provider.  •When lying down, lie on your left side. This keeps pressure off your major blood vessels.  •When sitting or lying down, raise (elevate) your feet. Try putting pillows underneath your lower legs  •Exercise regularly. Ask your health care provider what kinds of exercise are best for you.  •Check your blood pressure as often as recommended by your health care provider.  •Keep all prenatal and follow-up visits. This is important.  Contact a health care provider if:  •You have symptoms that may need treatment or closer monitoring. These include:  •Headaches.  •Stomach pain or nausea and vomiting.  •Shoulder pain.  •Vision problems, such as spots in front of your eyes or blurry vision.  •Sudden weight gain or increased swelling in your face, hands, legs, and feet.  •Increased anxiety or feeling of impending doom.  •Signs or symptoms of labor.  Get help right away if:  •You have any of the following symptoms:  •A seizure  •Shortness of breath or trouble breathing.  •Trouble speaking or slurred speech.  •Fainting.  •Chest pain.  These symptoms may represent a serious problem that is an emergency. Do not wait to see if the symptoms will go away. Get medical help right away. Call your local emergency services (911 in the [place] Do not drive yourself to the hospital.   Summary  •Preeclampsia is a serious condition that may develop during pregnancy.  •Diagnosing and treating preeclampsia early is very important.  •Keep all prenatal and follow-up visits. This is important.  •Get help right away if you have a seizure, shortness of breath or trouble breathing, trouble speaking or slurred speech, chest pain, or fainting.

## 2022-11-26 NOTE — ED PROVIDER NOTE - PATIENT PORTAL LINK FT
You can access the FollowMyHealth Patient Portal offered by [facility] by registering at the following website: [email or web address]. By joining [facility]’s FollowMyHealth portal, you will also be able to view your health information using other applications (apps) compatible with our system.

## 2022-11-26 NOTE — ED PROVIDER NOTE - ATTENDING APP SHARED VISIT CONTRIBUTION OF CARE
Attending Statement: I have personally performed a face to face diagnostic evaluation on this patient. I have reviewed the ACP note and agree with the history, exam and plan of care, except as noted.     Attending Contribution to Care:  37F with above PMHx, 10d post partum c/o 3d of h/a, no visual changes/n/v/fevers/dizziness, not hypertensive in ed, has not taken any tyl or motrin for the h/a, not thunder clap, not worst h/a of life, non focal neuro exam, well appearing, given toradol/reglan in ed w/good relief, however found to have elevated LFTs - gyn consulted for preeclampsia -- pt seen and cleared for dc w/strict return precautions, pt understands and agrees w/plan, strict return precautions given.

## 2022-11-26 NOTE — CONSULT NOTE ADULT - ASSESSMENT
36 y/o  POD#9 s/p scheduled rCS + BS presenting to ED for evaluation of HA which has since resolved, found to have new transaminitis.     - Pt does not have history of elevated BP in pregnancy or postpartum, normotensive in ED. Given resolution of HA and no elevated BP, low concern for PEC at this time.   - Found to have newly elevated LFTs however pt is asymptomatic, no RUQ/epigastric tenderness  - Pt is stable from GYN perspective for discharge at this time  - BP cuff sent to [facility]. Pt instructed to take BP at home twice daily and record values. Pt to call office on Monday to schedule follow up appointment and repeat labs.   - Pt educated on return precautions which include BP >160/110, HA that does not resolve w/ tylenol/motrin, vision changes/scotoma, chest pain, SOB, RUQ/epigastric pain, or worsening edema.  - All questions/concerns addressed. Pt expressed understanding  - D/w Dr. Lechuga, PGY3  - D/w BRAULIO Blake Attending    Grabiel Lo MD PGY2

## 2022-11-29 ENCOUNTER — TRANSCRIPTION ENCOUNTER (OUTPATIENT)
Age: 37
End: 2022-11-29

## 2022-11-29 LAB — SURGICAL PATHOLOGY STUDY: SIGNIFICANT CHANGE UP

## 2022-11-30 DIAGNOSIS — Z3A.39 39 WEEKS GESTATION OF PREGNANCY: ICD-10-CM

## 2022-11-30 DIAGNOSIS — O34.211 MATERNAL CARE FOR LOW TRANSVERSE SCAR FROM PREVIOUS CESAREAN DELIVERY: ICD-10-CM

## 2022-11-30 DIAGNOSIS — Z30.2 ENCOUNTER FOR STERILIZATION: ICD-10-CM

## 2023-12-07 NOTE — PATIENT PROFILE OB - NS PRO AD BILL OF RIGHTS
She was diagnosed with breast cancer on the right side in September of 2019 on a routine mammogram  She could not have surgery done for the breast cancer as she had spread at the time it was found  She could not get IV chemotherapy, as per her understanding because she has weak heart    She is currently on the following medication     Arimidex daily  Ibrance -3 weeks on 1 week off and she is going to have surgery on the week off time  Zoladex -once every 28 days  Xgeva-once every 28 days    To her understanding, her cancer is under control   I discussed the risk of thrombosis with Arimidex and infection risk with Ibrance based on what I looked up       She is doing good from a cancer standpoint calcium is normal    She has back pain for which she is taking occasional opioid Percocet 3 times a week     In view of the opioid use, the patient may have opioid tolerance . I suggest considering the possibility of opioid tolerance  in planning post operative pain control     Discussed possibly reducing the opioid use in preparation for surgery , so that it reduces the opioid tolerance which helps post op pain control         No

## 2024-01-01 NOTE — ED PROVIDER NOTE - OBJECTIVE STATEMENT
38 yo female  36 weeks pregnant female sent to the ED by her OBGYN for LLE weakness and pain. [place] Felt like she couldn't walk because of the pain. Denies trauma, falls, lower back pain, No urinary or fecal retention/incontinence. No syncope, cp, sob, dizziness, abdominal pain, n/v/d/c, vaginal bleeding. Pt just came from Dr. Velazquez's office- says baby was fine. Patient reports pain resolved after getting here. Comes on only with certain movements and if she walks for a long time.
.